# Patient Record
Sex: FEMALE | Race: WHITE | NOT HISPANIC OR LATINO
[De-identification: names, ages, dates, MRNs, and addresses within clinical notes are randomized per-mention and may not be internally consistent; named-entity substitution may affect disease eponyms.]

---

## 2020-01-21 ENCOUNTER — RESULT REVIEW (OUTPATIENT)
Age: 75
End: 2020-01-21

## 2020-02-20 PROBLEM — Z00.00 ENCOUNTER FOR PREVENTIVE HEALTH EXAMINATION: Status: ACTIVE | Noted: 2020-02-20

## 2021-10-04 ENCOUNTER — APPOINTMENT (OUTPATIENT)
Dept: INTERNAL MEDICINE | Facility: CLINIC | Age: 76
End: 2021-10-04
Payer: MEDICARE

## 2021-10-04 VITALS
OXYGEN SATURATION: 100 % | SYSTOLIC BLOOD PRESSURE: 156 MMHG | WEIGHT: 129 LBS | TEMPERATURE: 97.8 F | HEIGHT: 62.5 IN | BODY MASS INDEX: 23.14 KG/M2 | DIASTOLIC BLOOD PRESSURE: 82 MMHG | HEART RATE: 75 BPM | RESPIRATION RATE: 16 BRPM

## 2021-10-04 DIAGNOSIS — Z78.9 OTHER SPECIFIED HEALTH STATUS: ICD-10-CM

## 2021-10-04 DIAGNOSIS — I10 ESSENTIAL (PRIMARY) HYPERTENSION: ICD-10-CM

## 2021-10-04 DIAGNOSIS — Z80.1 FAMILY HISTORY OF MALIGNANT NEOPLASM OF TRACHEA, BRONCHUS AND LUNG: ICD-10-CM

## 2021-10-04 DIAGNOSIS — Z87.898 PERSONAL HISTORY OF OTHER SPECIFIED CONDITIONS: ICD-10-CM

## 2021-10-04 DIAGNOSIS — Z82.49 FAMILY HISTORY OF ISCHEMIC HEART DISEASE AND OTHER DISEASES OF THE CIRCULATORY SYSTEM: ICD-10-CM

## 2021-10-04 DIAGNOSIS — E03.9 HYPOTHYROIDISM, UNSPECIFIED: ICD-10-CM

## 2021-10-04 PROCEDURE — 99203 OFFICE O/P NEW LOW 30 MIN: CPT

## 2021-10-04 RX ORDER — OLMESARTAN MEDOXOMIL 20 MG/1
20 TABLET, FILM COATED ORAL
Qty: 30 | Refills: 0 | Status: ACTIVE | COMMUNITY
Start: 2021-06-29

## 2021-10-04 RX ORDER — SODIUM SULFATE, MAGNESIUM SULFATE, AND POTASSIUM CHLORIDE 17.75; 2.7; 2.25 G/1; G/1; G/1
1479-225-188 TABLET ORAL
Qty: 24 | Refills: 0 | Status: DISCONTINUED | COMMUNITY
Start: 2021-07-05

## 2021-10-04 RX ORDER — PROGESTERONE 100 MG/1
100 CAPSULE ORAL
Qty: 90 | Refills: 0 | Status: DISCONTINUED | COMMUNITY
Start: 2021-07-05

## 2021-10-04 RX ORDER — THYROID, PORCINE 60 MG/1
60 TABLET ORAL DAILY
Refills: 0 | Status: ACTIVE | COMMUNITY
Start: 2021-08-07

## 2021-10-04 NOTE — HEALTH RISK ASSESSMENT
[] : No [No] : In the past 12 months have you used drugs other than those required for medical reasons? No [0] : 2) Feeling down, depressed, or hopeless: Not at all (0) [PHQ-2 Negative - No further assessment needed] : PHQ-2 Negative - No further assessment needed [Audit-CScore] : 0 [[id number]] : 0

## 2021-10-04 NOTE — HISTORY OF PRESENT ILLNESS
[FreeTextEntry8] : 76 year F with PMH hypothyroidism and HTN presents to establish care. She recently had atypical cells removed from the right breast. She is up to date on screenings. Pt denies CP/SOB, fever/chills, n/v/d/c. \par \par Pap Smear: \par Mammogram: \par Colon Screenin/21\par DEXA:

## 2021-10-04 NOTE — PLAN
[FreeTextEntry1] : Pt presented to establish care. She is up to date with screenings. Pt advised to sign up for [facility] portal to review labs and communicate any questions or concerns directly. Yearly physical and return as needed for illness, medication refills, and new or existing complaints.

## 2023-01-19 ENCOUNTER — EMERGENCY (EMERGENCY)
Facility: HOSPITAL | Age: 78
LOS: 1 days | Discharge: HOME | End: 2023-01-19
Attending: EMERGENCY MEDICINE | Admitting: EMERGENCY MEDICINE
Payer: MEDICARE

## 2023-01-19 VITALS
HEART RATE: 80 BPM | WEIGHT: 130.07 LBS | TEMPERATURE: 98 F | OXYGEN SATURATION: 98 % | SYSTOLIC BLOOD PRESSURE: 190 MMHG | DIASTOLIC BLOOD PRESSURE: 90 MMHG | HEIGHT: 63 IN

## 2023-01-19 VITALS
SYSTOLIC BLOOD PRESSURE: 153 MMHG | HEART RATE: 79 BPM | TEMPERATURE: 98 F | DIASTOLIC BLOOD PRESSURE: 84 MMHG | OXYGEN SATURATION: 98 % | RESPIRATION RATE: 16 BRPM

## 2023-01-19 LAB
ALBUMIN SERPL ELPH-MCNC: 4 G/DL — SIGNIFICANT CHANGE UP (ref 3.3–5)
ALP SERPL-CCNC: 86 U/L — SIGNIFICANT CHANGE UP (ref 40–120)
ALT FLD-CCNC: 18 U/L — SIGNIFICANT CHANGE UP (ref 12–78)
ANION GAP SERPL CALC-SCNC: 7 MMOL/L — SIGNIFICANT CHANGE UP (ref 5–17)
AST SERPL-CCNC: 24 U/L — SIGNIFICANT CHANGE UP (ref 15–37)
BASOPHILS # BLD AUTO: 0.03 K/UL — SIGNIFICANT CHANGE UP (ref 0–0.2)
BASOPHILS NFR BLD AUTO: 0.4 % — SIGNIFICANT CHANGE UP (ref 0–2)
BILIRUB SERPL-MCNC: 0.4 MG/DL — SIGNIFICANT CHANGE UP (ref 0.2–1.2)
BUN SERPL-MCNC: 8 MG/DL — SIGNIFICANT CHANGE UP (ref 7–23)
CALCIUM SERPL-MCNC: 9.2 MG/DL — SIGNIFICANT CHANGE UP (ref 8.5–10.1)
CHLORIDE SERPL-SCNC: 97 MMOL/L — SIGNIFICANT CHANGE UP (ref 96–108)
CO2 SERPL-SCNC: 27 MMOL/L — SIGNIFICANT CHANGE UP (ref 22–31)
CREAT SERPL-MCNC: 0.69 MG/DL — SIGNIFICANT CHANGE UP (ref 0.5–1.3)
EGFR: 90 ML/MIN/1.73M2 — SIGNIFICANT CHANGE UP
EOSINOPHIL # BLD AUTO: 0.08 K/UL — SIGNIFICANT CHANGE UP (ref 0–0.5)
EOSINOPHIL NFR BLD AUTO: 1 % — SIGNIFICANT CHANGE UP (ref 0–6)
GLUCOSE SERPL-MCNC: 109 MG/DL — HIGH (ref 70–99)
HCT VFR BLD CALC: 35.3 % — SIGNIFICANT CHANGE UP (ref 34.5–45)
HGB BLD-MCNC: 12.2 G/DL — SIGNIFICANT CHANGE UP (ref 11.5–15.5)
IMM GRANULOCYTES NFR BLD AUTO: 0.4 % — SIGNIFICANT CHANGE UP (ref 0–0.9)
LIDOCAIN IGE QN: 98 U/L — SIGNIFICANT CHANGE UP (ref 73–393)
LYMPHOCYTES # BLD AUTO: 0.77 K/UL — LOW (ref 1–3.3)
LYMPHOCYTES # BLD AUTO: 9.7 % — LOW (ref 13–44)
MCHC RBC-ENTMCNC: 31 PG — SIGNIFICANT CHANGE UP (ref 27–34)
MCHC RBC-ENTMCNC: 34.6 GM/DL — SIGNIFICANT CHANGE UP (ref 32–36)
MCV RBC AUTO: 89.6 FL — SIGNIFICANT CHANGE UP (ref 80–100)
MONOCYTES # BLD AUTO: 0.59 K/UL — SIGNIFICANT CHANGE UP (ref 0–0.9)
MONOCYTES NFR BLD AUTO: 7.4 % — SIGNIFICANT CHANGE UP (ref 2–14)
NEUTROPHILS # BLD AUTO: 6.47 K/UL — SIGNIFICANT CHANGE UP (ref 1.8–7.4)
NEUTROPHILS NFR BLD AUTO: 81.1 % — HIGH (ref 43–77)
NRBC # BLD: 0 /100 WBCS — SIGNIFICANT CHANGE UP (ref 0–0)
PLATELET # BLD AUTO: 248 K/UL — SIGNIFICANT CHANGE UP (ref 150–400)
POTASSIUM SERPL-MCNC: 3.6 MMOL/L — SIGNIFICANT CHANGE UP (ref 3.5–5.3)
POTASSIUM SERPL-SCNC: 3.6 MMOL/L — SIGNIFICANT CHANGE UP (ref 3.5–5.3)
PROT SERPL-MCNC: 7.9 G/DL — SIGNIFICANT CHANGE UP (ref 6–8.3)
RBC # BLD: 3.94 M/UL — SIGNIFICANT CHANGE UP (ref 3.8–5.2)
RBC # FLD: 12.8 % — SIGNIFICANT CHANGE UP (ref 10.3–14.5)
SODIUM SERPL-SCNC: 131 MMOL/L — LOW (ref 135–145)
WBC # BLD: 7.97 K/UL — SIGNIFICANT CHANGE UP (ref 3.8–10.5)
WBC # FLD AUTO: 7.97 K/UL — SIGNIFICANT CHANGE UP (ref 3.8–10.5)

## 2023-01-19 PROCEDURE — 83690 ASSAY OF LIPASE: CPT

## 2023-01-19 PROCEDURE — 96374 THER/PROPH/DIAG INJ IV PUSH: CPT | Mod: XU

## 2023-01-19 PROCEDURE — 99284 EMERGENCY DEPT VISIT MOD MDM: CPT

## 2023-01-19 PROCEDURE — 74177 CT ABD & PELVIS W/CONTRAST: CPT | Mod: MA

## 2023-01-19 PROCEDURE — 80053 COMPREHEN METABOLIC PANEL: CPT

## 2023-01-19 PROCEDURE — 74177 CT ABD & PELVIS W/CONTRAST: CPT | Mod: 26,MA

## 2023-01-19 PROCEDURE — 85025 COMPLETE CBC W/AUTO DIFF WBC: CPT

## 2023-01-19 PROCEDURE — 36415 COLL VENOUS BLD VENIPUNCTURE: CPT

## 2023-01-19 PROCEDURE — 99284 EMERGENCY DEPT VISIT MOD MDM: CPT | Mod: 25

## 2023-01-19 PROCEDURE — 96375 TX/PRO/DX INJ NEW DRUG ADDON: CPT

## 2023-01-19 RX ORDER — PANTOPRAZOLE SODIUM 20 MG/1
1 TABLET, DELAYED RELEASE ORAL
Qty: 30 | Refills: 0
Start: 2023-01-19 | End: 2023-02-17

## 2023-01-19 RX ORDER — PANTOPRAZOLE SODIUM 20 MG/1
40 TABLET, DELAYED RELEASE ORAL ONCE
Refills: 0 | Status: COMPLETED | OUTPATIENT
Start: 2023-01-19 | End: 2023-01-19

## 2023-01-19 RX ORDER — ACETAMINOPHEN 500 MG
1000 TABLET ORAL ONCE
Refills: 0 | Status: COMPLETED | OUTPATIENT
Start: 2023-01-19 | End: 2023-01-19

## 2023-01-19 RX ORDER — ONDANSETRON 8 MG/1
4 TABLET, FILM COATED ORAL ONCE
Refills: 0 | Status: COMPLETED | OUTPATIENT
Start: 2023-01-19 | End: 2023-01-19

## 2023-01-19 RX ORDER — SODIUM CHLORIDE 9 MG/ML
2000 INJECTION INTRAMUSCULAR; INTRAVENOUS; SUBCUTANEOUS ONCE
Refills: 0 | Status: COMPLETED | OUTPATIENT
Start: 2023-01-19 | End: 2023-01-19

## 2023-01-19 RX ORDER — ONDANSETRON 8 MG/1
1 TABLET, FILM COATED ORAL
Qty: 30 | Refills: 0
Start: 2023-01-19 | End: 2023-01-28

## 2023-01-19 RX ADMIN — ONDANSETRON 4 MILLIGRAM(S): 8 TABLET, FILM COATED ORAL at 16:15

## 2023-01-19 RX ADMIN — Medication 400 MILLIGRAM(S): at 16:15

## 2023-01-19 RX ADMIN — PANTOPRAZOLE SODIUM 40 MILLIGRAM(S): 20 TABLET, DELAYED RELEASE ORAL at 16:15

## 2023-01-19 RX ADMIN — SODIUM CHLORIDE 2000 MILLILITER(S): 9 INJECTION INTRAMUSCULAR; INTRAVENOUS; SUBCUTANEOUS at 16:15

## 2023-01-19 NOTE — ED PROVIDER NOTE - NSFOLLOWUPINSTRUCTIONS_ED_ALL_ED_FT
Viral Gastroenteritis, Adult    Body outline showing the digestive tract, including the stomach, small intestine, and large intestine.   Viral gastroenteritis is also known as the stomach flu. This condition may affect your stomach, your small intestine, and your large intestine. It can cause sudden watery poop (diarrhea), fever, and vomiting. This condition is caused by certain germs (viruses). These germs can be passed from person to person very easily (are contagious).    Having watery poop and vomiting can make you feel weak and cause you to not have enough water in your body (get dehydrated). This can make you tired and thirsty, make you have a dry mouth, and make it so you pee (urinate) less often. It is important to replace the fluids that you lose from having watery poop and vomiting.      What are the causes?    •You can get sick by catching germs from other people.    •You can also get sick by:  •Eating food, drinking water, or touching a surface that has the germs on it (is contaminated).      •Sharing utensils or other personal items with a person who is sick.          What increases the risk?    •Having a weak body defense system (immune system).      •Living with one or more children who are younger than 2 years.      •Living in a nursing home.      •Going on cruise ships.        What are the signs or symptoms?    Symptoms of this condition start suddenly. Symptoms may last for a few days or for as long as a week.•Common symptoms include:  •Watery poop.      •Vomiting.      •Other symptoms include:  •Fever.      •Headache.      •Feeling tired (fatigue).      •Pain in the belly (abdomen).      •Chills.      •Feeling weak.      •Feeling like you may vomit (nauseous).      •Muscle aches.      •Not feeling hungry.          How is this treated?    This condition typically goes away on its own. The focus of treatment is to replace the fluids that you lose. This condition may be treated with:  •An ORS (oral rehydration solution). This is a drink that helps you replace fluids and minerals your body lost. It is sold at pharmacies and stores.      •Medicines to help with your symptoms.      •Probiotic supplements to reduce symptoms of watery poop.      •Fluids given through an IV tube, if needed.      Older adults and people with other diseases or a weak body defense system are at higher risk for not having enough water in the body.      Follow these instructions at home:      Eating and drinking   Three cups showing dark yellow, yellow, and pale yellow pee.   •Take an ORS as told by your doctor.    •Drink clear fluids in small amounts as you are able. Clear fluids include:  •Water.      •Ice chips.      •Fruit juice that has water added to it (is diluted).      •Low-calorie sports drinks.        •Drink enough fluid to keep your pee (urine) pale yellow.      •Eat small amounts of healthy foods every 3–4 hours as you are able. This may include whole grains, fruits, vegetables, lean meats, and yogurt.      •Avoid fluids that have a lot of sugar or caffeine in them. This includes energy drinks, sports drinks, and soda.      •Avoid spicy or fatty foods.      •Avoid alcohol.        General instructions   Washing hands with soap and water.   •Wash your hands often. This is very important after you have watery poop or you vomit. If you cannot use soap and water, use hand .      •Make sure that all people in your home wash their hands well and often.      •Take over-the-counter and prescription medicines only as told by your doctor.      •Rest at home while you get better.      •Watch your condition for any changes.      •Take a warm bath to help with any burning or pain from having watery poop.      •Keep all follow-up visits.        Contact a doctor if:    •You cannot keep fluids down.      •Your symptoms get worse.      •You have new symptoms.      •You feel light-headed or dizzy.      •You have muscle cramps.        Get help right away if:    •You have chest pain.      •You have trouble breathing, or you are breathing very fast.      •You have a fast heartbeat.      •You feel very weak or you faint.      •You have a very bad headache, a stiff neck, or both.      •You have a rash.      •You have very bad pain, cramping, or bloating in your belly.      •Your skin feels cold and clammy.      •You feel mixed up (confused).      •You have pain when you pee.    •You have signs of not having enough water in the body, such as:  •Dark pee, hardly any pee, or no pee.      •Cracked lips.      •Dry mouth.      •Sunken eyes.      •Feeling very sleepy.      •Feeling weak.      •You have signs of bleeding, such as:  •You see blood in your vomit.      •Your vomit looks like coffee grounds.      •You have bloody or black poop or poop that looks like tar.        These symptoms may be an emergency. Get help right away. Call 911.   • Do not wait to see if the symptoms will go away.       • Do not drive yourself to the hospital.         Summary    •Viral gastroenteritis is also known as the stomach flu.      •This condition can cause sudden watery poop (diarrhea), fever, and vomiting.      •These germs can be passed from person to person very easily.      •Take an ORS (oral rehydration solution) as told by your doctor. This is a drink that is sold at pharmacies and stores.      •Wash your hands often, especially after having watery poop or vomiting. If you cannot use soap and water, use hand .      This information is not intended to replace advice given to you by your health care provider. Make sure you discuss any questions you have with your health care provider.

## 2023-01-19 NOTE — ED ADULT TRIAGE NOTE - HEIGHT IN FEET
Admission Reconciliation is Completed  Discharge Reconciliation is Not Complete 5 Admission Reconciliation is Completed  Discharge Reconciliation is Completed

## 2023-01-19 NOTE — ED PROVIDER NOTE - PATIENT PORTAL LINK FT
You can access the FollowMyHealth Patient Portal offered by [facility] by registering at the following website: [email or web address]. By joining [facility]’s FollowMyHealth portal, you will also be able to view your health information using other applications (apps) compatible with our system.

## 2023-01-19 NOTE — ED ADULT NURSE NOTE - OBJECTIVE STATEMENT
15:48-Pt presented to Er with c/o progressive diffused abd pain, n/v/d along with intermitting dizziness and headache x 2 days. In ER eval by Provider, labs obtained, meds admin, txp to CT, will treat [per plan of care, HAYDER Kapoor

## 2023-01-19 NOTE — ED PROVIDER NOTE - CLINICAL SUMMARY MEDICAL DECISION MAKING FREE TEXT BOX
77-year-old female with nausea vomiting and diarrhea x2 days we will check electrolytes CBC rule out viral versus bacterial etiology, CT rule out colitis, IV fluids, PPI, Zofran and supportive care.

## 2023-01-19 NOTE — ED PROVIDER NOTE - OBJECTIVE STATEMENT
77-year-old female with no significant past medical history presents to the ED with complaint of nausea vomiting diarrhea x2 days.  Patient states that this has happened in the past with similar symptoms every 5 to 10 years as per patient.  Patient is status post appendectomy.  Patient complaining of epigastric pain, epigastric burning.  Patient denies any new foods or sick contacts.

## 2023-06-08 ENCOUNTER — OFFICE (OUTPATIENT)
Dept: URBAN - METROPOLITAN AREA CLINIC 109 | Facility: CLINIC | Age: 78
Setting detail: OPHTHALMOLOGY
End: 2023-06-08
Payer: COMMERCIAL

## 2023-06-08 DIAGNOSIS — H02.831: ICD-10-CM

## 2023-06-08 DIAGNOSIS — H02.834: ICD-10-CM

## 2023-06-08 DIAGNOSIS — H02.832: ICD-10-CM

## 2023-06-08 DIAGNOSIS — H02.835: ICD-10-CM

## 2023-06-08 DIAGNOSIS — H52.7: ICD-10-CM

## 2023-06-08 DIAGNOSIS — H25.13: ICD-10-CM

## 2023-06-08 PROCEDURE — 99213 OFFICE O/P EST LOW 20 MIN: CPT | Performed by: OPHTHALMOLOGY

## 2023-06-08 ASSESSMENT — REFRACTION_CURRENTRX
OD_SPHERE: -1.25
OD_CYLINDER: -1.75
OD_VPRISM_DIRECTION: PROGS
OS_CYLINDER: -1.00
OS_ADD: +2.75
OS_OVR_VA: 20/
OS_ADD: +2.50
OS_CYLINDER: -1.00
OD_ADD: +2.75
OD_OVR_VA: 20/
OS_SPHERE: -2.25
OS_OVR_VA: 20/
OD_SPHERE: -2.25
OD_AXIS: 096
OS_AXIS: 075
OD_CYLINDER: -1.75
OS_VPRISM_DIRECTION: PROGS
OS_VPRISM_DIRECTION: PROGS
OD_ADD: +2.50
OS_AXIS: 78
OD_VPRISM_DIRECTION: PROGS
OD_AXIS: 97
OD_OVR_VA: 20/
OS_SPHERE: -2.75

## 2023-06-08 ASSESSMENT — SPHEQUIV_DERIVED
OS_SPHEQUIV: -2.5
OD_SPHEQUIV: -2.25
OD_SPHEQUIV: -2
OS_SPHEQUIV: -1.625
OD_SPHEQUIV: -1.875
OS_SPHEQUIV: -2.375

## 2023-06-08 ASSESSMENT — REFRACTION_MANIFEST
OS_SPHERE: -2.00
OS_SPHERE: -1.75
OD_ADD: +2.75
OD_VA1: 20/30+
OD_SPHERE: -1.25
OD_CYLINDER: -2.00
OS_ADD: +2.75
OD_CYLINDER: -2.00
OD_SPHERE: -1.00
OS_AXIS: 90
OS_CYLINDER: -1.00
OS_VA1: 20/30+
OS_AXIS: 80
OD_AXIS: 90
OS_VA1: 20/20-2
OS_CYLINDER: -1.25
OD_VA1: 20/20-2
OD_AXIS: 95

## 2023-06-08 ASSESSMENT — VISUAL ACUITY
OS_BCVA: 20/30-1
OD_BCVA: 20/40+2

## 2023-06-08 ASSESSMENT — REFRACTION_AUTOREFRACTION
OD_SPHERE: -1.00
OD_AXIS: 081
OS_SPHERE: -1.00
OS_AXIS: 089
OS_CYLINDER: -1.25
OD_CYLINDER: -1.75

## 2023-06-08 ASSESSMENT — SUPERFICIAL PUNCTATE KERATITIS (SPK)
OS_SPK: ABSENT
OD_SPK: ABSENT

## 2023-06-08 ASSESSMENT — TONOMETRY
OD_IOP_MMHG: 19
OS_IOP_MMHG: 19

## 2023-06-08 ASSESSMENT — CONFRONTATIONAL VISUAL FIELD TEST (CVF)
OD_FINDINGS: FULL
OS_FINDINGS: FULL

## 2023-11-10 ENCOUNTER — APPOINTMENT (OUTPATIENT)
Dept: OBGYN | Facility: CLINIC | Age: 78
End: 2023-11-10

## 2024-06-11 ENCOUNTER — OFFICE (OUTPATIENT)
Dept: URBAN - METROPOLITAN AREA CLINIC 109 | Facility: CLINIC | Age: 79
Setting detail: OPHTHALMOLOGY
End: 2024-06-11
Payer: COMMERCIAL

## 2024-06-11 DIAGNOSIS — H02.832: ICD-10-CM

## 2024-06-11 DIAGNOSIS — H02.831: ICD-10-CM

## 2024-06-11 DIAGNOSIS — H02.835: ICD-10-CM

## 2024-06-11 DIAGNOSIS — H43.393: ICD-10-CM

## 2024-06-11 DIAGNOSIS — H52.7: ICD-10-CM

## 2024-06-11 DIAGNOSIS — H25.13: ICD-10-CM

## 2024-06-11 DIAGNOSIS — H02.834: ICD-10-CM

## 2024-06-11 PROCEDURE — 92014 COMPRE OPH EXAM EST PT 1/>: CPT | Performed by: OPHTHALMOLOGY

## 2024-06-11 PROCEDURE — 92015 DETERMINE REFRACTIVE STATE: CPT | Performed by: OPHTHALMOLOGY

## 2024-06-11 PROCEDURE — 92201 OPSCPY EXTND RTA DRAW UNI/BI: CPT | Performed by: OPHTHALMOLOGY

## 2024-06-11 ASSESSMENT — CONFRONTATIONAL VISUAL FIELD TEST (CVF)
OS_FINDINGS: FULL
OD_FINDINGS: FULL

## 2024-07-31 ENCOUNTER — OFFICE (OUTPATIENT)
Dept: URBAN - METROPOLITAN AREA CLINIC 109 | Facility: CLINIC | Age: 79
Setting detail: OPHTHALMOLOGY
End: 2024-07-31
Payer: COMMERCIAL

## 2024-07-31 DIAGNOSIS — H02.832: ICD-10-CM

## 2024-07-31 DIAGNOSIS — H02.831: ICD-10-CM

## 2024-07-31 DIAGNOSIS — H25.13: ICD-10-CM

## 2024-07-31 DIAGNOSIS — H02.834: ICD-10-CM

## 2024-07-31 DIAGNOSIS — H02.835: ICD-10-CM

## 2024-07-31 DIAGNOSIS — H43.393: ICD-10-CM

## 2024-07-31 DIAGNOSIS — H35.61: ICD-10-CM

## 2024-07-31 PROCEDURE — 99213 OFFICE O/P EST LOW 20 MIN: CPT | Performed by: OPHTHALMOLOGY

## 2024-07-31 ASSESSMENT — CONFRONTATIONAL VISUAL FIELD TEST (CVF)
OS_FINDINGS: FULL
OD_FINDINGS: FULL

## 2024-10-03 ENCOUNTER — OFFICE (OUTPATIENT)
Dept: URBAN - METROPOLITAN AREA CLINIC 109 | Facility: CLINIC | Age: 79
Setting detail: OPHTHALMOLOGY
End: 2024-10-03
Payer: COMMERCIAL

## 2024-10-03 DIAGNOSIS — H02.832: ICD-10-CM

## 2024-10-03 DIAGNOSIS — H02.835: ICD-10-CM

## 2024-10-03 DIAGNOSIS — H43.393: ICD-10-CM

## 2024-10-03 DIAGNOSIS — H02.831: ICD-10-CM

## 2024-10-03 DIAGNOSIS — H02.834: ICD-10-CM

## 2024-10-03 DIAGNOSIS — H35.61: ICD-10-CM

## 2024-10-03 DIAGNOSIS — H25.13: ICD-10-CM

## 2024-10-03 PROCEDURE — 99213 OFFICE O/P EST LOW 20 MIN: CPT | Performed by: OPHTHALMOLOGY

## 2024-10-03 ASSESSMENT — CONFRONTATIONAL VISUAL FIELD TEST (CVF)
OD_FINDINGS: FULL
OS_FINDINGS: FULL

## 2024-10-03 ASSESSMENT — REFRACTION_CURRENTRX
OD_ADD: +2.75
OS_CYLINDER: -1.00
OD_SPHERE: -2.25
OS_SPHERE: -2.25
OS_CYLINDER: -1.00
OD_SPHERE: -1.25
OS_SPHERE: -2.75
OS_OVR_VA: 20/
OS_ADD: +2.75
OD_AXIS: 096
OS_ADD: +2.50
OD_VPRISM_DIRECTION: PROGS
OD_AXIS: 97
OD_OVR_VA: 20/
OS_OVR_VA: 20/
OS_VPRISM_DIRECTION: PROGS
OD_ADD: +2.50
OD_VPRISM_DIRECTION: PROGS
OS_VPRISM_DIRECTION: PROGS
OD_OVR_VA: 20/
OS_AXIS: 075
OS_AXIS: 78
OD_CYLINDER: -1.75
OD_CYLINDER: -1.75

## 2024-10-03 ASSESSMENT — REFRACTION_MANIFEST
OS_SPHERE: -1.25
OD_ADD: +2.75
OS_CYLINDER: -1.50
OS_SPHERE: -2.00
OS_ADD: +2.50
OS_AXIS: 90
OS_VA1: 20/20-3
OD_AXIS: 90
OD_VA1: 20/20-3
OS_CYLINDER: -1.00
OD_ADD: +2.50
OS_VA1: 20/30+
OD_SPHERE: -1.25
OD_CYLINDER: -2.00
OS_ADD: +2.75
OD_SPHERE: -1.00
OD_AXIS: 95
OD_CYLINDER: -2.00
OD_VA1: 20/30+
OS_AXIS: 80

## 2024-10-03 ASSESSMENT — REFRACTION_AUTOREFRACTION
OD_CYLINDER: -2.50
OS_CYLINDER: -2.75
OD_SPHERE: -0.75
OS_AXIS: 85
OD_AXIS: 95
OS_SPHERE: -0.75

## 2024-10-03 ASSESSMENT — TONOMETRY
OD_IOP_MMHG: 17
OD_IOP_MMHG: 16
OS_IOP_MMHG: 17
OS_IOP_MMHG: 16

## 2024-10-03 ASSESSMENT — SUPERFICIAL PUNCTATE KERATITIS (SPK)
OS_SPK: ABSENT
OD_SPK: ABSENT

## 2024-10-03 ASSESSMENT — VISUAL ACUITY
OD_BCVA: 20/30-2
OS_BCVA: 20/40+1

## 2025-04-03 ENCOUNTER — OFFICE (OUTPATIENT)
Dept: URBAN - METROPOLITAN AREA CLINIC 109 | Facility: CLINIC | Age: 80
Setting detail: OPHTHALMOLOGY
End: 2025-04-03
Payer: COMMERCIAL

## 2025-04-03 DIAGNOSIS — H25.13: ICD-10-CM

## 2025-04-03 DIAGNOSIS — H35.61: ICD-10-CM

## 2025-04-03 DIAGNOSIS — H02.832: ICD-10-CM

## 2025-04-03 DIAGNOSIS — H02.831: ICD-10-CM

## 2025-04-03 PROCEDURE — 99213 OFFICE O/P EST LOW 20 MIN: CPT | Performed by: OPHTHALMOLOGY

## 2025-04-03 ASSESSMENT — REFRACTION_CURRENTRX
OS_SPHERE: -1.25
OS_VPRISM_DIRECTION: PROGS
OD_CYLINDER: -1.75
OD_ADD: +2.50
OS_ADD: +2.50
OS_AXIS: 090
OS_SPHERE: -2.75
OD_OVR_VA: 20/
OD_SPHERE: -2.25
OD_OVR_VA: 20/
OD_ADD: +2.50
OS_VPRISM_DIRECTION: PROGS
OD_CYLINDER: -2.00
OS_CYLINDER: -1.00
OD_AXIS: 090
OD_AXIS: 97
OD_VPRISM_DIRECTION: PROGS
OS_ADD: +2.50
OS_OVR_VA: 20/
OS_AXIS: 78
OD_VPRISM_DIRECTION: PROGS
OD_SPHERE: -1.00
OS_CYLINDER: -1.50
OS_OVR_VA: 20/

## 2025-04-03 ASSESSMENT — REFRACTION_MANIFEST
OD_SPHERE: -1.25
OD_AXIS: 90
OD_ADD: +2.50
OS_SPHERE: -1.25
OS_ADD: +2.50
OD_CYLINDER: -2.00
OD_SPHERE: -1.00
OD_AXIS: 95
OS_VA1: 20/30+
OS_VA1: 20/20-3
OS_SPHERE: -2.00
OS_CYLINDER: -1.50
OD_VA1: 20/20-3
OD_VA1: 20/30+
OS_CYLINDER: -1.00
OS_AXIS: 80
OS_AXIS: 90
OD_ADD: +2.75
OD_CYLINDER: -2.00
OS_ADD: +2.75

## 2025-04-03 ASSESSMENT — TONOMETRY
OD_IOP_MMHG: 17
OS_IOP_MMHG: 17
OS_IOP_MMHG: 17
OD_IOP_MMHG: 16

## 2025-04-03 ASSESSMENT — REFRACTION_AUTOREFRACTION
OD_AXIS: 95
OS_CYLINDER: -2.75
OD_CYLINDER: -2.50
OS_AXIS: 090
OD_SPHERE: -0.25
OS_SPHERE: -0.75

## 2025-04-03 ASSESSMENT — VISUAL ACUITY
OD_BCVA: 20/20-2
OS_BCVA: 20/25

## 2025-04-03 ASSESSMENT — SUPERFICIAL PUNCTATE KERATITIS (SPK)
OD_SPK: ABSENT
OS_SPK: ABSENT

## 2025-04-03 ASSESSMENT — CONFRONTATIONAL VISUAL FIELD TEST (CVF)
OD_FINDINGS: FULL
OS_FINDINGS: FULL